# Patient Record
Sex: MALE | Race: WHITE | Employment: FULL TIME | URBAN - METROPOLITAN AREA
[De-identification: names, ages, dates, MRNs, and addresses within clinical notes are randomized per-mention and may not be internally consistent; named-entity substitution may affect disease eponyms.]

---

## 2020-07-24 ENCOUNTER — HOSPITAL ENCOUNTER (EMERGENCY)
Age: 30
Discharge: HOME | End: 2020-07-25
Attending: EMERGENCY MEDICINE
Payer: MEDICAID

## 2020-07-24 ENCOUNTER — APPOINTMENT (OUTPATIENT)
Dept: GENERAL RADIOLOGY | Age: 30
End: 2020-07-24
Payer: MEDICAID

## 2020-07-24 VITALS
HEIGHT: 70 IN | RESPIRATION RATE: 28 BRPM | SYSTOLIC BLOOD PRESSURE: 131 MMHG | OXYGEN SATURATION: 98 % | TEMPERATURE: 99.2 F | WEIGHT: 256.6 LBS | HEART RATE: 90 BPM | DIASTOLIC BLOOD PRESSURE: 83 MMHG | BODY MASS INDEX: 36.73 KG/M2

## 2020-07-24 PROCEDURE — 73130 X-RAY EXAM OF HAND: CPT

## 2020-07-24 PROCEDURE — 99283 EMERGENCY DEPT VISIT LOW MDM: CPT

## 2020-07-24 ASSESSMENT — PAIN SCALES - GENERAL: PAINLEVEL_OUTOF10: 10

## 2020-07-24 ASSESSMENT — PAIN DESCRIPTION - PAIN TYPE: TYPE: ACUTE PAIN

## 2020-07-24 ASSESSMENT — PAIN DESCRIPTION - ORIENTATION: ORIENTATION: LEFT

## 2020-07-24 ASSESSMENT — PAIN DESCRIPTION - LOCATION: LOCATION: FINGER (COMMENT WHICH ONE)

## 2020-07-24 ASSESSMENT — PAIN DESCRIPTION - ONSET: ONSET: SUDDEN

## 2020-07-24 ASSESSMENT — PAIN DESCRIPTION - DESCRIPTORS: DESCRIPTORS: THROBBING

## 2020-07-25 PROCEDURE — 6360000002 HC RX W HCPCS: Performed by: EMERGENCY MEDICINE

## 2020-07-25 PROCEDURE — 6370000000 HC RX 637 (ALT 250 FOR IP): Performed by: EMERGENCY MEDICINE

## 2020-07-25 PROCEDURE — 90471 IMMUNIZATION ADMIN: CPT | Performed by: EMERGENCY MEDICINE

## 2020-07-25 PROCEDURE — 90715 TDAP VACCINE 7 YRS/> IM: CPT | Performed by: EMERGENCY MEDICINE

## 2020-07-25 RX ORDER — HYDROCODONE BITARTRATE AND ACETAMINOPHEN 5; 325 MG/1; MG/1
1 TABLET ORAL EVERY 4 HOURS PRN
Qty: 18 TABLET | Refills: 0 | Status: SHIPPED | OUTPATIENT
Start: 2020-07-25 | End: 2020-07-28

## 2020-07-25 RX ORDER — CEPHALEXIN 500 MG/1
500 CAPSULE ORAL 4 TIMES DAILY
Qty: 40 CAPSULE | Refills: 0 | Status: SHIPPED | OUTPATIENT
Start: 2020-07-25 | End: 2020-08-05 | Stop reason: SDUPTHER

## 2020-07-25 RX ORDER — CEPHALEXIN 250 MG/1
1000 CAPSULE ORAL ONCE
Status: COMPLETED | OUTPATIENT
Start: 2020-07-25 | End: 2020-07-25

## 2020-07-25 RX ADMIN — TETANUS TOXOID, REDUCED DIPHTHERIA TOXOID AND ACELLULAR PERTUSSIS VACCINE, ADSORBED 0.5 ML: 5; 2.5; 8; 8; 2.5 SUSPENSION INTRAMUSCULAR at 01:00

## 2020-07-25 RX ADMIN — CEPHALEXIN 1000 MG: 250 CAPSULE ORAL at 01:00

## 2020-07-25 ASSESSMENT — PAIN DESCRIPTION - PAIN TYPE
TYPE: ACUTE PAIN
TYPE: ACUTE PAIN

## 2020-07-25 ASSESSMENT — PAIN SCALES - GENERAL
PAINLEVEL_OUTOF10: 10
PAINLEVEL_OUTOF10: 8

## 2020-07-25 ASSESSMENT — PAIN - FUNCTIONAL ASSESSMENT
PAIN_FUNCTIONAL_ASSESSMENT: ACTIVITIES ARE NOT PREVENTED
PAIN_FUNCTIONAL_ASSESSMENT: PREVENTS OR INTERFERES SOME ACTIVE ACTIVITIES AND ADLS
PAIN_FUNCTIONAL_ASSESSMENT: 0-10

## 2020-07-25 ASSESSMENT — PAIN DESCRIPTION - ORIENTATION
ORIENTATION: LEFT
ORIENTATION: LEFT

## 2020-07-25 ASSESSMENT — PAIN DESCRIPTION - PROGRESSION
CLINICAL_PROGRESSION: NOT CHANGED
CLINICAL_PROGRESSION: GRADUALLY IMPROVING

## 2020-07-25 ASSESSMENT — PAIN DESCRIPTION - LOCATION
LOCATION: FINGER (COMMENT WHICH ONE);HAND
LOCATION: HAND;FINGER (COMMENT WHICH ONE)

## 2020-07-25 ASSESSMENT — PAIN DESCRIPTION - DESCRIPTORS
DESCRIPTORS: THROBBING
DESCRIPTORS: THROBBING

## 2020-07-25 ASSESSMENT — PAIN DESCRIPTION - ONSET
ONSET: ON-GOING
ONSET: ON-GOING

## 2020-07-25 NOTE — ED PROVIDER NOTES
eMERGENCY dEPARTMENT eNCOUnter      Pt Name: Apryl Asencio  MRN: [id number]  [place] 1990  Date of evaluation: 7/24/2020  Provider: Ashley Shane MD     CHIEF COMPLAINT       Chief Complaint   Patient presents with    Animal Bite     Pt was breaking up a dog fight. He caught his LRF in dog's mouth. He states he felt a \"pop. \" Pt finger avulsed from just above DIP. Bone exposed. Area continues to bleed pressure and dressing applied per EMD; 0020 Hand soaking         HISTORY OF PRESENT ILLNESS   (Location/Symptom, Timing/Onset,Context/Setting, Quality, Duration, Modifying Factors, Severity) Note limiting factors. HPI    Apryl Asencio is a 30 y.o. male who presents to the emergency department with left index finger amputation. Patient is left-hand dominant was breaking up a dog fight and accidentally was bitten on the left hand. This happened about 30 minutes prior to arrival.  Tetanus is not up-to-date. Patient did put a dressing on prior to arrival.  No active bleeding. Patient appears to be very anxious. He states that he is afraid of needles. Nursing Notes were reviewed. REVIEW OFSYSTEMS    (2+ for level 4; 10+ for level 5)   Review of Systems    General: No fevers, chills or night sweats, No weight loss    Head:  No Sore throat,  No Ear Pain    Chest:  Nontender. No Cough, No SOB,  Chest Pain    GI: No abdominal pain or vomiting    : No dysuria or hematuria    Musculoskeletal: No unrelenting pain or night pain    Neurologic: No bowel or bladder incontinence, No saddle anesthesia, No leg weakness    All other systems reviewed and are negative. PAST MEDICAL HISTORY     Past Medical History:   Diagnosis Date    Anxiety     Opiate abuse, episodic ([facility] [address]        SURGICAL HISTORY     History reviewed. No pertinent surgical history. CURRENT MEDICATIONS       Previous Medications    No medications on file       ALLERGIES     No known allergies    FAMILY HISTORY     History reviewed.  No pertinent family history. SOCIAL HISTORY       Social History     Socioeconomic History    Marital status: Single     Spouse name: None    Number of children: None    Years of education: None    Highest education level: None   Occupational History    None   Social Needs    Financial resource strain: None    Food insecurity     Worry: None     Inability: None    Transportation needs     Medical: None     Non-medical: None   Tobacco Use    Smoking status: Current Every Day Smoker    Smokeless tobacco: Never Used   Substance and Sexual Activity    Alcohol use: Yes     Comment: occas    Drug use: Yes     Types: Marijuana    Sexual activity: None   Lifestyle    Physical activity     Days per week: None     Minutes per session: None    Stress: None   Relationships    Social connections     Talks on phone: None     Gets together: None     Attends Denominational service: None     Active member of club or organization: None     Attends meetings of clubs or organizations: None     Relationship status: None    Intimate partner violence     Fear of current or ex partner: None     Emotionally abused: None     Physically abused: None     Forced sexual activity: None   Other Topics Concern    None   Social History Narrative    None       SCREENINGS           PHYSICAL EXAM    (up to 7 for level 4, 8 or more for level 5)     ED Triage Vitals [07/24/20 2315]   BP Temp Temp Source Pulse Resp SpO2 Height Weight   131/83 99.2 °F (37.3 °C) Oral 90 28 98 % 5' 10\" (1.778 m) 256 lb 9.6 oz (116.4 kg)       Physical Exam    General: Alert and awake ×3. Nontoxic appearance. Well-developed well-nourished 23-year-old white male in moderate pain and anxiety. HEENT: Normocephalic atraumatic. Neck is supple. Airway intact. No adenopathy  Cardiac: Regular rate and rhythm with no murmurs rubs or gallops  Pulmonary: Lungs are clear in all lung fields. No wheezing. No Rales. Abdomen: Soft and nontender.   Negative hepatosplenomegaly. Bowel sounds are active  Extremities: Moving all extremities. No calf tenderness. Peripheral pulses all intact. Left hand dominant with index finger showing a 1 cm or the distal tip of his finger amputated. There is no nail that I can see at this point. I do do not see any bone protruding. But suspect may be a open fracture. Skin: No skin lesions. No rashes  Neurologic: Cranial nerves II through XII was grossly intact. Nonfocal neurological exam  Psychiatric: Patient is pleasant. Mood is appropriate. DIAGNOSTIC RESULTS     EKG (Per Emergency Physician):       RADIOLOGY (Per Emergency Physician): Interpretation per the Radiologist below, if available at the time of this note:  Xr Hand Left (min 3 Views)    Result Date: 7/25/2020  Traumatic bony and soft tissue amputation of distal 2nd digit to include tuft of distal 2nd phalanx. Soft tissue swelling and soft tissue gas more proximally to the 2nd digit, likely traumatic in etiology. No radiopaque foreign bodies noted. ED BEDSIDE ULTRASOUND:   Performed by ED Physician - none    LABS:  Labs Reviewed - No data to display     All other labs were within normal range or not returned as of this dictation. Procedures    Vigorous cleaning and irrigation of his finger wound. Very conservative treatment done. Occlusive dressing apply    Antibiotics given as well as tetanus updated in the ED. EMERGENCY DEPARTMENT COURSE and DIFFERENTIAL DIAGNOSIS/MDM:   Vitals:    Vitals:    07/24/20 2315   BP: 131/83   Pulse: 90   Resp: 28   Temp: 99.2 °F (37.3 °C)   TempSrc: Oral   SpO2: 98%   Weight: 256 lb 9.6 oz (116.4 kg)   Height: 5' 10\" (1.778 m)       Medications   Tetanus-Diphth-Acell Pertussis (BOOSTRIX) injection 0.5 mL (0.5 mLs Intramuscular Given 7/25/20 0100)   cephALEXin (KEFLEX) capsule 1,000 mg (1,000 mg Oral Given 7/25/20 0100)       MDM.     This is a 68-year-old with a left index finger amputation from a dog bite. Tetanus and antibiotics started. Patient did not want to have surgery. I gave him the option of conservative treatment and hand surgery patient states he does not not like needles and would take his\" chances\". Patient does not want to have a shortened index finger. Reviewed x-rays and options with him. Chances of infection greater but he will be placed on antibiotics. Referral to hand surgery for follow-up. Patient elected to do conservative treatment at this time. REVAL:         CRITICAL CARE TIME   Total CriticalCare time was 0 minutes, excluding separately reportable procedures. There was a high probability of clinically significant/life threatening deterioration in the patient's condition which required my urgent intervention. CONSULTS:  None    PROCEDURES:  Unless otherwise noted below, none     [unfilled]    FINAL IMPRESSION      1. Fingertip amputation, initial encounter    2. Dog bite, initial encounter    3. Open fracture of tuft of distal phalanx of finger          DISPOSITION/PLAN   DISPOSITION        PATIENT REFERRED TO:  Lukas Cruz MD  [address] [place] [address]  [phone]    Schedule an appointment as soon as possible for a visit in 1 week  If symptoms worsen      DISCHARGE MEDICATIONS:  New Prescriptions    CEPHALEXIN (KEFLEX) 500 MG CAPSULE    Take 1 capsule by mouth 4 times daily for 10 days    HYDROCODONE-ACETAMINOPHEN (NORCO) 5-325 MG PER TABLET    Take 1 tablet by mouth every 4 hours as needed for Pain for up to 3 days. Intended supply: 3 days. Take lowest dose possible to manage pain          (Please note:  Portions of this note were completed with a voice recognition program.Efforts were made to edit the dictations but occasionally words and phrases are mis-transcribed.)  Form v2016. J.5-cn    Gerhardt Cables LAM, MD (electronically signed)  Emergency Medicine Provider        Savannah Barker MD  07/25/20 0111

## 2020-07-27 ENCOUNTER — OFFICE VISIT (OUTPATIENT)
Dept: ORTHOPEDIC SURGERY | Age: 30
End: 2020-07-27
Payer: MEDICAID

## 2020-07-27 ENCOUNTER — TELEPHONE (OUTPATIENT)
Dept: ORTHOPEDIC SURGERY | Age: 30
End: 2020-07-27

## 2020-07-27 VITALS — TEMPERATURE: 96.6 F

## 2020-07-27 PROCEDURE — 99203 OFFICE O/P NEW LOW 30 MIN: CPT | Performed by: PHYSICIAN ASSISTANT

## 2020-07-27 PROCEDURE — G8427 DOCREV CUR MEDS BY ELIG CLIN: HCPCS | Performed by: PHYSICIAN ASSISTANT

## 2020-07-27 PROCEDURE — 4004F PT TOBACCO SCREEN RCVD TLK: CPT | Performed by: PHYSICIAN ASSISTANT

## 2020-07-27 PROCEDURE — G8419 CALC BMI OUT NRM PARAM NOF/U: HCPCS | Performed by: PHYSICIAN ASSISTANT

## 2020-07-28 NOTE — PROGRESS NOTES
Patient Name: Darren Altamirano  Medical Record Number: <[id number]>  YOB: 1990  Date of Encounter: 7/27/2020     Chief Complaint   Patient presents with    Hand Injury     Left hand       History of Present Illness:  Darren Altamirano is a 30 y.o. male who presents to the [address] [place] [address] for evaluation of his left hand. His pain assessment is documented below and I reviewed this with him today. Patient went to [facility] emergency department on 7/24/2020 after the tip of his left index finger was bitten off trying to break up a dog fight. He was scheduled to see Dr. Katlyn Granados today but was a no-show. He presents today just to have his dressing changed. He states he has been taking his antibiotics as directed. Past Medical History:   Diagnosis Date    Anxiety     Opiate abuse, episodic ([address]        History reviewed. No pertinent surgical history. Current Outpatient Medications   Medication Sig Dispense Refill    cephALEXin (KEFLEX) 500 MG capsule Take 1 capsule by mouth 4 times daily for 10 days 40 capsule 0    HYDROcodone-acetaminophen (NORCO) 5-325 MG per tablet Take 1 tablet by mouth every 4 hours as needed for Pain for up to 3 days. Intended supply: 3 days. Take lowest dose possible to manage pain 18 tablet 0     No current facility-administered medications for this visit. Allergies, social and family histories were reviewed and updated as appropriate. Review of Systems:  Relevant review of systems reviewed and available in the patient's chart under the 'MEDIA' tab. Vital Signs:  Temp 96.6 °F (35.9 °C) (Temporal)     General Exam:   Constitutional: Patient is adequately groomed with no evidence of malnutrition  Mental Status: The patient is oriented to time, place and person. The patient's mood and affect are appropriate. Lymphatic: The lymphatic examination bilaterally reveals all areas to be without enlargement or induration.   Neurological: The patient has good coordination and balance. There is no focal weakness or sensory deficit. Left Hand Examination:    On examination of patient's left hand there is a amputation of the tip of his left index finger. There is no erythema, induration, warmth or drainage. He seems to have full range of motion of the MCP, PIP and DIP joints. Impression:  Encounter Diagnoses   Name Primary?  Traumatic amputation of TIP OF left index finger Yes    Encounter for change of dressing        Treatment Plan:          Patient presented today for a dressing change of his left index finger. He has an amputation of the tip of his left index finger. This happened while trying to break up a dog fight on 7/24/2020. Dressing was changed and new sterile dressings applied. There is no sign of infection. Patient has a follow-up appointment with Almas Tobar on Wednesday    Joeann Lincoln Georgeana Carrel was informed of the results of any imaging. We discussed treatment options and a time was given to answer questions. A plan was proposed and Zelda Baxter understand and accepts this course of care. Electronically signed by Fadumo Lawler PA-C on 4/07/0955  Board Certified [facility]    Please note that portions of this note were completed with a voice recognition program.  Efforts were made to edit the dictations but occasionally words are mis-transcribed.

## 2020-08-04 ENCOUNTER — TELEPHONE (OUTPATIENT)
Dept: ORTHOPEDIC SURGERY | Age: 30
End: 2020-08-04

## 2020-08-04 NOTE — TELEPHONE ENCOUNTER
Please call patient regarding his appt for yesterday and  told patient he was being dismissed. Patient would like to schedule to see the hand doctor. Wanted to speak with manager.

## 2020-08-04 NOTE — TELEPHONE ENCOUNTER
Called and spoke with patient. Dr. Tray Baum has agreed to see patient. He understands he is to be NPO and arrive at 8:15 for 8:30am appt. I also made patient aware that if he is late he will be dismissed from the practice. He stated he understood and would be here by 8:15am NPO.

## 2020-08-05 ENCOUNTER — ANESTHESIA (OUTPATIENT)
Dept: OPERATING ROOM | Age: 30
End: 2020-08-05
Payer: MEDICAID

## 2020-08-05 ENCOUNTER — HOSPITAL ENCOUNTER (OUTPATIENT)
Age: 30
Setting detail: OUTPATIENT SURGERY
Discharge: HOME | End: 2020-08-05
Attending: ORTHOPAEDIC SURGERY | Admitting: ORTHOPAEDIC SURGERY
Payer: MEDICAID

## 2020-08-05 ENCOUNTER — ANESTHESIA EVENT (OUTPATIENT)
Dept: OPERATING ROOM | Age: 30
End: 2020-08-05
Payer: MEDICAID

## 2020-08-05 ENCOUNTER — OFFICE VISIT (OUTPATIENT)
Dept: ORTHOPEDIC SURGERY | Age: 30
End: 2020-08-05
Payer: MEDICAID

## 2020-08-05 VITALS
TEMPERATURE: 97 F | OXYGEN SATURATION: 99 % | HEART RATE: 70 BPM | SYSTOLIC BLOOD PRESSURE: 139 MMHG | RESPIRATION RATE: 16 BRPM | DIASTOLIC BLOOD PRESSURE: 91 MMHG

## 2020-08-05 VITALS
TEMPERATURE: 98.6 F | OXYGEN SATURATION: 96 % | RESPIRATION RATE: 2 BRPM | SYSTOLIC BLOOD PRESSURE: 132 MMHG | DIASTOLIC BLOOD PRESSURE: 83 MMHG

## 2020-08-05 VITALS — RESPIRATION RATE: 16 BRPM | HEIGHT: 70 IN | WEIGHT: 256 LBS | TEMPERATURE: 97.7 F | BODY MASS INDEX: 36.65 KG/M2

## 2020-08-05 LAB — SARS-COV-2, NAAT: NOT DETECTED

## 2020-08-05 PROCEDURE — 6360000002 HC RX W HCPCS: Performed by: NURSE ANESTHETIST, CERTIFIED REGISTERED

## 2020-08-05 PROCEDURE — 3700000000 HC ANESTHESIA ATTENDED CARE: Performed by: ORTHOPAEDIC SURGERY

## 2020-08-05 PROCEDURE — 99214 OFFICE O/P EST MOD 30 MIN: CPT | Performed by: ORTHOPAEDIC SURGERY

## 2020-08-05 PROCEDURE — 3600000002 HC SURGERY LEVEL 2 BASE: Performed by: ORTHOPAEDIC SURGERY

## 2020-08-05 PROCEDURE — G8417 CALC BMI ABV UP PARAM F/U: HCPCS | Performed by: ORTHOPAEDIC SURGERY

## 2020-08-05 PROCEDURE — 6370000000 HC RX 637 (ALT 250 FOR IP): Performed by: ANESTHESIOLOGY

## 2020-08-05 PROCEDURE — 2709999900 HC NON-CHARGEABLE SUPPLY: Performed by: ORTHOPAEDIC SURGERY

## 2020-08-05 PROCEDURE — 11012 DEB SKIN BONE AT FX SITE: CPT | Performed by: ORTHOPAEDIC SURGERY

## 2020-08-05 PROCEDURE — 4004F PT TOBACCO SCREEN RCVD TLK: CPT | Performed by: ORTHOPAEDIC SURGERY

## 2020-08-05 PROCEDURE — 7100000010 HC PHASE II RECOVERY - FIRST 15 MIN: Performed by: ORTHOPAEDIC SURGERY

## 2020-08-05 PROCEDURE — G8427 DOCREV CUR MEDS BY ELIG CLIN: HCPCS | Performed by: ORTHOPAEDIC SURGERY

## 2020-08-05 PROCEDURE — 3700000001 HC ADD 15 MINUTES (ANESTHESIA): Performed by: ORTHOPAEDIC SURGERY

## 2020-08-05 PROCEDURE — 6360000002 HC RX W HCPCS: Performed by: ORTHOPAEDIC SURGERY

## 2020-08-05 PROCEDURE — 7100000011 HC PHASE II RECOVERY - ADDTL 15 MIN: Performed by: ORTHOPAEDIC SURGERY

## 2020-08-05 PROCEDURE — 2580000003 HC RX 258: Performed by: ORTHOPAEDIC SURGERY

## 2020-08-05 PROCEDURE — 2500000003 HC RX 250 WO HCPCS: Performed by: NURSE ANESTHETIST, CERTIFIED REGISTERED

## 2020-08-05 PROCEDURE — 26765 TREAT FINGER FRACTURE EACH: CPT | Performed by: ORTHOPAEDIC SURGERY

## 2020-08-05 PROCEDURE — U0002 COVID-19 LAB TEST NON-CDC: HCPCS

## 2020-08-05 PROCEDURE — 2580000003 HC RX 258: Performed by: NURSE ANESTHETIST, CERTIFIED REGISTERED

## 2020-08-05 PROCEDURE — 3600000012 HC SURGERY LEVEL 2 ADDTL 15MIN: Performed by: ORTHOPAEDIC SURGERY

## 2020-08-05 PROCEDURE — 7100000001 HC PACU RECOVERY - ADDTL 15 MIN: Performed by: ORTHOPAEDIC SURGERY

## 2020-08-05 PROCEDURE — 7100000000 HC PACU RECOVERY - FIRST 15 MIN: Performed by: ORTHOPAEDIC SURGERY

## 2020-08-05 PROCEDURE — 2500000003 HC RX 250 WO HCPCS: Performed by: ORTHOPAEDIC SURGERY

## 2020-08-05 RX ORDER — LIDOCAINE HYDROCHLORIDE 20 MG/ML
INJECTION, SOLUTION EPIDURAL; INFILTRATION; INTRACAUDAL; PERINEURAL PRN
Status: DISCONTINUED | OUTPATIENT
Start: 2020-08-05 | End: 2020-08-05 | Stop reason: SDUPTHER

## 2020-08-05 RX ORDER — TRAMADOL HYDROCHLORIDE 50 MG/1
50 TABLET ORAL EVERY 6 HOURS PRN
Qty: 20 TABLET | Refills: 0 | Status: SHIPPED | OUTPATIENT
Start: 2020-08-05 | End: 2020-08-10

## 2020-08-05 RX ORDER — OXYCODONE HYDROCHLORIDE AND ACETAMINOPHEN 5; 325 MG/1; MG/1
2 TABLET ORAL PRN
Status: COMPLETED | OUTPATIENT
Start: 2020-08-05 | End: 2020-08-05

## 2020-08-05 RX ORDER — GLYCOPYRROLATE 0.2 MG/ML
INJECTION INTRAMUSCULAR; INTRAVENOUS PRN
Status: DISCONTINUED | OUTPATIENT
Start: 2020-08-05 | End: 2020-08-05 | Stop reason: SDUPTHER

## 2020-08-05 RX ORDER — MAGNESIUM HYDROXIDE 1200 MG/15ML
LIQUID ORAL CONTINUOUS PRN
Status: COMPLETED | OUTPATIENT
Start: 2020-08-05 | End: 2020-08-05

## 2020-08-05 RX ORDER — ONDANSETRON 2 MG/ML
INJECTION INTRAMUSCULAR; INTRAVENOUS PRN
Status: DISCONTINUED | OUTPATIENT
Start: 2020-08-05 | End: 2020-08-05 | Stop reason: SDUPTHER

## 2020-08-05 RX ORDER — ONDANSETRON 2 MG/ML
4 INJECTION INTRAMUSCULAR; INTRAVENOUS
Status: DISCONTINUED | OUTPATIENT
Start: 2020-08-05 | End: 2020-08-05 | Stop reason: HOSPADM

## 2020-08-05 RX ORDER — SODIUM CHLORIDE 9 MG/ML
INJECTION, SOLUTION INTRAVENOUS CONTINUOUS PRN
Status: DISCONTINUED | OUTPATIENT
Start: 2020-08-05 | End: 2020-08-05 | Stop reason: SDUPTHER

## 2020-08-05 RX ORDER — BUPIVACAINE HYDROCHLORIDE 5 MG/ML
INJECTION, SOLUTION EPIDURAL; INTRACAUDAL
Status: COMPLETED | OUTPATIENT
Start: 2020-08-05 | End: 2020-08-05

## 2020-08-05 RX ORDER — MIDAZOLAM HYDROCHLORIDE 1 MG/ML
INJECTION INTRAMUSCULAR; INTRAVENOUS PRN
Status: DISCONTINUED | OUTPATIENT
Start: 2020-08-05 | End: 2020-08-05 | Stop reason: SDUPTHER

## 2020-08-05 RX ORDER — DEXAMETHASONE SODIUM PHOSPHATE 4 MG/ML
INJECTION, SOLUTION INTRA-ARTICULAR; INTRALESIONAL; INTRAMUSCULAR; INTRAVENOUS; SOFT TISSUE PRN
Status: DISCONTINUED | OUTPATIENT
Start: 2020-08-05 | End: 2020-08-05 | Stop reason: SDUPTHER

## 2020-08-05 RX ORDER — CEFAZOLIN SODIUM 1 G/3ML
INJECTION, POWDER, FOR SOLUTION INTRAMUSCULAR; INTRAVENOUS PRN
Status: DISCONTINUED | OUTPATIENT
Start: 2020-08-05 | End: 2020-08-05

## 2020-08-05 RX ORDER — KETAMINE HCL IN NACL, ISO-OSM 100MG/10ML
SYRINGE (ML) INJECTION PRN
Status: DISCONTINUED | OUTPATIENT
Start: 2020-08-05 | End: 2020-08-05 | Stop reason: SDUPTHER

## 2020-08-05 RX ORDER — FENTANYL CITRATE 50 UG/ML
25 INJECTION, SOLUTION INTRAMUSCULAR; INTRAVENOUS EVERY 5 MIN PRN
Status: DISCONTINUED | OUTPATIENT
Start: 2020-08-05 | End: 2020-08-05 | Stop reason: HOSPADM

## 2020-08-05 RX ORDER — PROPOFOL 10 MG/ML
INJECTION, EMULSION INTRAVENOUS PRN
Status: DISCONTINUED | OUTPATIENT
Start: 2020-08-05 | End: 2020-08-05 | Stop reason: SDUPTHER

## 2020-08-05 RX ORDER — CEPHALEXIN 500 MG/1
500 CAPSULE ORAL 4 TIMES DAILY
Qty: 40 CAPSULE | Refills: 0 | Status: SHIPPED | OUTPATIENT
Start: 2020-08-05 | End: 2020-08-15

## 2020-08-05 RX ORDER — FENTANYL CITRATE 50 UG/ML
INJECTION, SOLUTION INTRAMUSCULAR; INTRAVENOUS PRN
Status: DISCONTINUED | OUTPATIENT
Start: 2020-08-05 | End: 2020-08-05 | Stop reason: SDUPTHER

## 2020-08-05 RX ORDER — OXYCODONE HYDROCHLORIDE AND ACETAMINOPHEN 5; 325 MG/1; MG/1
1 TABLET ORAL PRN
Status: COMPLETED | OUTPATIENT
Start: 2020-08-05 | End: 2020-08-05

## 2020-08-05 RX ADMIN — DEXAMETHASONE SODIUM PHOSPHATE 8 MG: 4 INJECTION, SOLUTION INTRAMUSCULAR; INTRAVENOUS at 13:04

## 2020-08-05 RX ADMIN — PROPOFOL 100 MG: 10 INJECTION, EMULSION INTRAVENOUS at 13:08

## 2020-08-05 RX ADMIN — ONDANSETRON 4 MG: 2 INJECTION INTRAMUSCULAR; INTRAVENOUS at 13:04

## 2020-08-05 RX ADMIN — GLYCOPYRROLATE 0.2 MG: 0.2 INJECTION, SOLUTION INTRAMUSCULAR; INTRAVENOUS at 13:04

## 2020-08-05 RX ADMIN — LIDOCAINE HYDROCHLORIDE 100 MG: 20 INJECTION, SOLUTION EPIDURAL; INFILTRATION; INTRACAUDAL; PERINEURAL at 12:59

## 2020-08-05 RX ADMIN — OXYCODONE HYDROCHLORIDE AND ACETAMINOPHEN 2 TABLET: 5; 325 TABLET ORAL at 14:42

## 2020-08-05 RX ADMIN — Medication 30 MG: at 13:20

## 2020-08-05 RX ADMIN — MIDAZOLAM 2 MG: 1 INJECTION INTRAMUSCULAR; INTRAVENOUS at 12:55

## 2020-08-05 RX ADMIN — CEFAZOLIN 2 G: 10 INJECTION, POWDER, FOR SOLUTION INTRAVENOUS at 13:06

## 2020-08-05 RX ADMIN — FENTANYL CITRATE 100 MCG: 50 INJECTION INTRAMUSCULAR; INTRAVENOUS at 12:56

## 2020-08-05 RX ADMIN — HYDROMORPHONE HYDROCHLORIDE 1 MG: 1 INJECTION, SOLUTION INTRAMUSCULAR; INTRAVENOUS; SUBCUTANEOUS at 13:17

## 2020-08-05 RX ADMIN — SODIUM CHLORIDE: 9 INJECTION, SOLUTION INTRAVENOUS at 12:54

## 2020-08-05 RX ADMIN — PROPOFOL 200 MG: 10 INJECTION, EMULSION INTRAVENOUS at 12:59

## 2020-08-05 ASSESSMENT — PAIN DESCRIPTION - ORIENTATION
ORIENTATION: LEFT
ORIENTATION: LEFT

## 2020-08-05 ASSESSMENT — PAIN DESCRIPTION - DESCRIPTORS
DESCRIPTORS: THROBBING

## 2020-08-05 ASSESSMENT — PAIN DESCRIPTION - DIRECTION: RADIATING_TOWARDS: INTO HAND

## 2020-08-05 ASSESSMENT — PULMONARY FUNCTION TESTS
PIF_VALUE: 14
PIF_VALUE: 15
PIF_VALUE: 14
PIF_VALUE: 15
PIF_VALUE: 14
PIF_VALUE: 15
PIF_VALUE: 0
PIF_VALUE: 0
PIF_VALUE: 15
PIF_VALUE: 1
PIF_VALUE: 15
PIF_VALUE: 15
PIF_VALUE: 14
PIF_VALUE: 14
PIF_VALUE: 16
PIF_VALUE: 2
PIF_VALUE: 2
PIF_VALUE: 15
PIF_VALUE: 16
PIF_VALUE: 0
PIF_VALUE: 14
PIF_VALUE: 1
PIF_VALUE: 2
PIF_VALUE: 14
PIF_VALUE: 16
PIF_VALUE: 1
PIF_VALUE: 15
PIF_VALUE: 0
PIF_VALUE: 14
PIF_VALUE: 16
PIF_VALUE: 15
PIF_VALUE: 15
PIF_VALUE: 13
PIF_VALUE: 14
PIF_VALUE: 14

## 2020-08-05 ASSESSMENT — PAIN SCALES - GENERAL
PAINLEVEL_OUTOF10: 0
PAINLEVEL_OUTOF10: 10
PAINLEVEL_OUTOF10: 0
PAINLEVEL_OUTOF10: 0
PAINLEVEL_OUTOF10: 5

## 2020-08-05 ASSESSMENT — PAIN DESCRIPTION - ONSET
ONSET: ON-GOING
ONSET: ON-GOING

## 2020-08-05 ASSESSMENT — PAIN - FUNCTIONAL ASSESSMENT
PAIN_FUNCTIONAL_ASSESSMENT: PREVENTS OR INTERFERES SOME ACTIVE ACTIVITIES AND ADLS
PAIN_FUNCTIONAL_ASSESSMENT: ACTIVITIES ARE NOT PREVENTED
PAIN_FUNCTIONAL_ASSESSMENT: 0-10
PAIN_FUNCTIONAL_ASSESSMENT: ACTIVITIES ARE NOT PREVENTED

## 2020-08-05 ASSESSMENT — PAIN DESCRIPTION - PROGRESSION
CLINICAL_PROGRESSION: NOT CHANGED
CLINICAL_PROGRESSION: RAPIDLY WORSENING

## 2020-08-05 ASSESSMENT — PAIN DESCRIPTION - LOCATION
LOCATION: FINGER (COMMENT WHICH ONE)
LOCATION: FINGER (COMMENT WHICH ONE)

## 2020-08-05 ASSESSMENT — ENCOUNTER SYMPTOMS: SHORTNESS OF BREATH: 0

## 2020-08-05 ASSESSMENT — PAIN DESCRIPTION - FREQUENCY
FREQUENCY: CONTINUOUS
FREQUENCY: CONTINUOUS

## 2020-08-05 ASSESSMENT — LIFESTYLE VARIABLES: SMOKING_STATUS: 1

## 2020-08-05 ASSESSMENT — PAIN DESCRIPTION - PAIN TYPE
TYPE: SURGICAL PAIN
TYPE: ACUTE PAIN

## 2020-08-05 NOTE — PROGRESS NOTES
Alert and oriented. Denies pain or nausea at this time. Vss. Dressing is clean dry intact. Fingers are warm, move well, nataliia well. Tolerated sitting up and po fluids and crackers well. patient and friend verbalized understanding of discharge instructions.

## 2020-08-05 NOTE — PROGRESS NOTES
From PACU. Alert and oriented. Denies nausea or pain now. Dressing is clean dry intact. Fingers are warm, move well, nataliia well.

## 2020-08-05 NOTE — PROGRESS NOTES
CHIEF COMPLAINT: Left hand pain/ index finger distal phalanx open fracture, dog bite. DATE OF INJURY: 7/24/2020    HISTORY:  Mr. Christine Potts 30 y.o.  male left handed presents today for the first visit for evaluation of a left hand injury which occurred when he was bitten by his dog. He is complaining of index finger pain and swelling. This is better with elevation and worse with ROM. The pain is sharp and not radiating. No other complaint. He was seen at [facility], was evaluated and splinted and asked to see orthopedics Dr Ashley Hernandez, He was scheduled to see Dr Marcelina Mcdermott on 7/27 at [facility], he couldn't make it there as he is a [facility], then he went to [address] [place] [address]. As Dr Ashley Hernandez was OOT, he was scheduled with Deangelo Isidro 7/29/2020, then 8/3/2020 when he was 45 min late and then rescheduled to see me today. He uses Marijuana as medical treatment for chronic LBP    Past Medical History:   Diagnosis Date    Anxiety     Opiate abuse, episodic ([facility] [address]      History reviewed. No pertinent surgical history.   Social History     Socioeconomic History    Marital status: Single     Spouse name: Not on file    Number of children: Not on file    Years of education: Not on file    Highest education level: Not on file   Occupational History    Not on file   Social Needs    Financial resource strain: Not on file    Food insecurity     Worry: Not on file     Inability: Not on file    Transportation needs     Medical: Not on file     Non-medical: Not on file   Tobacco Use    Smoking status: Current Every Day Smoker    Smokeless tobacco: Never Used   Substance and Sexual Activity    Alcohol use: Yes     Comment: occas    Drug use: Yes     Types: Marijuana    Sexual activity: Not on file   Lifestyle    Physical activity     Days per week: Not on file     Minutes per session: Not on file    Stress: Not on file   Relationships    Social connections     Talks on phone: Not on file     Gets together: Not on file     Attends

## 2020-08-05 NOTE — ANESTHESIA POSTPROCEDURE EVALUATION
Department of Anesthesiology  Postprocedure Note    Patient: Gustavo Kennedy  MRN: [id number]  YOB: 1990  Date of evaluation: 8/5/2020  Time:  3:04 PM     Procedure Summary     Date:  08/05/20 Room / Location:  [address] [place] [address]    Anesthesia Start:  1255 Anesthesia Stop:  7540    Procedure:  INCISION AND DRAINAGE LEFT INDEX FINGER OPEN FRACTURE (Left Hand) Diagnosis:  (.)    Surgeon:  Alisa Rodríguez MD Responsible Provider:  Lisa Blue MD    Anesthesia Type:  general ASA Status:  2          Anesthesia Type: general    Martha Phase I: Martha Score: 9    Martha Phase II: Martha Score: 10    Last vitals: Reviewed and per EMR flowsheets.        Anesthesia Post Evaluation    Patient location during evaluation: PACU  Patient participation: complete - patient participated  Level of consciousness: awake and alert  Airway patency: patent  Nausea & Vomiting: no nausea and no vomiting  Complications: no  Cardiovascular status: hemodynamically stable  Respiratory status: acceptable  Hydration status: stable

## 2020-08-05 NOTE — BRIEF OP NOTE
Brief Postoperative Note      Patient: Billie Salinas  YOB: 1990  MRN: [id number]    Date of Procedure: 8/5/2020    Pre-Op Diagnosis:  Left hand index finger distal phalanx open fracture.     Post-Op Diagnosis: Same       Procedure(s):  INCISION AND DRAINAGE LEFT INDEX FINGER OPEN FRACTURE    Surgeon(s):  Conner Ballesteros MD    Assistant: Gustavo Webster CNP    Anesthesia: General    Estimated Blood Loss (mL): Minimal    Complications: None    Specimens:   * No specimens in log *    Implants:  * No implants in log *      Drains: * No LDAs found *    Findings: Same    Electronically signed by Conner Ballesteros MD on 8/5/2020 at 3:10 PM

## 2020-08-05 NOTE — ANESTHESIA PRE PROCEDURE
Department of Anesthesiology  Preprocedure Note       Name:  Priya Warren   Age:  30 y.o.  :  1990                                          MRN:  [id number]         Date:  2020      Surgeon: Carlito Gottlieb):  Bruce Morales MD    Procedure: Procedure(s):  INCISION AND DRAINAGE LEFT INDEX FINGER OPEN FRACTURE    Medications prior to admission:   Prior to Admission medications    Medication Sig Start Date End Date Taking? Authorizing Provider   medical marijuana Take 1 each by mouth as needed. Yes Historical Provider, MD   cephALEXin (KEFLEX) 500 MG capsule Take 1 capsule by mouth 4 times daily for 10 days 8/5/20 8/15/20  Bruce Morales MD   traMADol (ULTRAM) 50 MG tablet Take 1 tablet by mouth every 6 hours as needed for Pain for up to 5 days. 8/5/20 8/10/20  Bruce Morales MD       Current medications:    No current facility-administered medications for this encounter. Allergies: Allergies   Allergen Reactions    No Known Allergies        Problem List:  There is no problem list on file for this patient. Past Medical History:        Diagnosis Date    Anxiety     Opiate abuse, episodic ([facility] [address]        Past Surgical History:  No past surgical history on file.     Social History:    Social History     Tobacco Use    Smoking status: Current Every Day Smoker    Smokeless tobacco: Never Used   Substance Use Topics    Alcohol use: Yes     Comment: occas                                Ready to quit: Not Answered  Counseling given: Not Answered      Vital Signs (Current):   Vitals:    20 1052   BP: 127/65   Pulse: 83   Resp: 14   Temp: 97 °F (36.1 °C)   TempSrc: Temporal   SpO2: 94%                                              BP Readings from Last 3 Encounters:   20 127/65   20 131/83       NPO Status: Time of last liquid consumption:                         Time of last solid consumption:                         Date of last liquid consumption: 20 Date of last solid food consumption: 08/04/20    BMI:   Wt Readings from Last 3 Encounters:   08/05/20 256 lb (116.1 kg)   07/24/20 256 lb 9.6 oz (116.4 kg)     There is no height or weight on file to calculate BMI.    CBC:   Lab Results   Component Value Date    WBC 10.2 05/11/2010    RBC 5.27 05/11/2010    HGB 16.1 05/11/2010    HCT 46.8 05/11/2010    MCV 88.9 05/11/2010    RDW 12.8 05/11/2010     05/11/2010       CMP:   Lab Results   Component Value Date     05/11/2010    K 3.7 05/11/2010     05/11/2010    CO2 23 05/11/2010    BUN 9 05/11/2010    CREATININE 0.9 05/11/2010    GFRAA >60 05/11/2010    AGRATIO 1.6 05/11/2010    GLUCOSE 143 05/11/2010    PROT 8.2 05/11/2010    CALCIUM 10.1 05/11/2010    BILITOT 1.30 05/11/2010    ALKPHOS 80 05/11/2010    AST 22 05/11/2010    ALT 23 05/11/2010       POC Tests: No results for input(s): POCGLU, POCNA, POCK, POCCL, POCBUN, POCHEMO, POCHCT in the last 72 hours. Coags: No results found for: PROTIME, INR, APTT    HCG (If Applicable): No results found for: PREGTESTUR, PREGSERUM, HCG, HCGQUANT     ABGs: No results found for: PHART, PO2ART, SKA1DWT, HEU8BKB, BEART, W2SYSYFD     Type & Screen (If Applicable):  No results found for: LABABO, LABRH    Drug/Infectious Status (If Applicable):  No results found for: HIV, HEPCAB    COVID-19 Screening (If Applicable): No results found for: COVID19      Anesthesia Evaluation  Patient summary reviewed no history of anesthetic complications:   Airway: Mallampati: II  TM distance: >3 FB   Neck ROM: full  Mouth opening: > = 3 FB Dental:      Comment: No loose teeth    Pulmonary:normal exam    (+) current smoker (Smoked MJ 1000)    (-) COPD, asthma, shortness of breath and recent URI          Patient smoked on day of surgery. ROS comment: Patient states he has a medical MJ card, according to patient, he smokes all day every day.     Cardiovascular:        (-) hypertension, valvular problems/murmurs, past MI, CAD, CABG/stent, dysrhythmias,  angina,  CHF, orthopnea and murmur                Neuro/Psych:   (+) psychiatric history (alert and oriented x 3):depression/anxiety    (-) seizures, neuromuscular disease, TIA and CVA           GI/Hepatic/Renal:        (-) GERD, PUD, hepatitis, liver disease, no renal disease and bowel prep       Endo/Other:        (-) diabetes mellitus, hypothyroidism, hyperthyroidism, blood dyscrasia, arthritis, no electrolyte abnormalities               Abdominal:           Vascular:                                      Anesthesia Plan      general     ASA 2     (Patient is competent to sign his own consent. Shows understanding and risks of procedure. Answers questions appropriately. )  Induction: intravenous. MIPS: Postoperative opioids intended and Prophylactic antiemetics administered. Anesthetic plan and risks discussed with patient. Plan discussed with CRNA. This pre-anesthesia assessment may be used as a history and physical.    DOS STAFF ADDENDUM:    Pt seen and examined, chart reviewed (including anesthesia, drug and allergy history). No interval changes to history and physical examination. Anesthetic plan, risks, benefits, alternatives, and personnel involved discussed with patient. Patient verbalized an understanding and agrees to proceed.       Hardy Damon MD  August 5, 2020  11:11 AM    Hardy Damon MD   8/5/2020

## 2020-08-05 NOTE — H&P
Preoperative H&P Update    The patient's History and Physical in the medical record from 8/5/2020 was reviewed by me today. Past Medical History:   Diagnosis Date    Anxiety     Opiate abuse, episodic ([facility])      No past surgical history on file. No current facility-administered medications on file prior to encounter. Current Outpatient Medications on File Prior to Encounter   Medication Sig Dispense Refill    medical marijuana Take 1 each by mouth as needed.  cephALEXin (KEFLEX) 500 MG capsule Take 1 capsule by mouth 4 times daily for 10 days 40 capsule 0    traMADol (ULTRAM) 50 MG tablet Take 1 tablet by mouth every 6 hours as needed for Pain for up to 5 days. 20 tablet 0       Allergies   Allergen Reactions    No Known Allergies       I reviewed the HPI, medications, allergies, reason for surgery, diagnosis and treatment plan and there has been no change. The patient was evaluated by me today. Physical exam findings for this update include:    Vitals:    08/05/20 1052   BP: 127/65   Pulse: 83   Resp: 14   Temp: 97 °F (36.1 °C)   SpO2: 94%     Airway is intact  Chest: chest clear, no wheezing, rales, normal symmetric air entry, no tachypnea, retractions or cyanosis  Heart: regular rate and rhythm ; heart sounds normal  Findings on exam of the body region where surgery is to be performed include:  Left index distal phalanx open fracture.     Electronically signed by Sergio Villela on 8/5/2020 at 11:13 AM

## 2020-08-05 NOTE — PROGRESS NOTES
Awake. Currently denies pain but worried about what he will do if it starts to hurt. Denies nausea. Taking ice chips. States Santiago [phone] for ride. Elevated BP Dr. Gi Child aware no orders rec'd.

## 2020-08-05 NOTE — PROGRESS NOTES
atient wants to leave. Not happy with MD pain medication order when he gets home. Currently denies pain.  VSS for transfer to phase 2

## 2020-08-06 NOTE — OP NOTE
[address] [facility]                                OPERATIVE REPORT    PATIENT NAME: Mendoza Becker                      :        1990  MED REC NO:   [id number]                          ROOM:  ACCOUNT NO:   [de-identified]                           ADMIT DATE: 2020  PROVIDER:     Bashir Mcknight MD      DATE OF PROCEDURE:  2020    PREOPERATIVE DIAGNOSIS:  Left hand index finger distal phalanx open  fracture. POSTOPERATIVE DIAGNOSIS:  Left hand index finger distal phalanx open  fracture. OPERATION PERFORMED:  1. Debridement of open fracture with excisional debridement of skin,  subcutaneous tissue, fascia and bone. 2.  Open treatment of left index finger distal phalanx fracture. SURGEON:  Bashir Mcknight MD    ASSISTANT:  Kb Magana CNP    ANESTHESIA:  General anesthesia. ESTIMATED BLOOD LOSS:  Minimal.    COMPLICATIONS:  None. TOURNIQUET:  Left index finger base. INDICATIONS:  This is a 17-year-old white male, left-hand dominant, who  sustained injury to his left index finger when he was bitten by his dog. This happened on 2020. He was seen at [facility] Room  on 2020, was referred to hand surgery and was unable to be  scheduled, as he missed an appointment on 2020, seen in the after  hours on that days and because of Dr. Erika Ghosh being out of town, he was  not able to be seen that week. He also missed two appointments. He was  late for his appointment on Monday, and he was scheduled with me for  evaluation today. Upon evaluation, he was found to have an exposed bone  at the tip of the index finger. We recommended surgical debridement. All risks, benefits, and alternatives were discussed with the patient. He agreed to proceed with surgical treatment. OPERATIVE PROCEDURE:  The patient's left index finger and hand were  marked.   He received 2 gm of Ancef IV preoperatively. The patient was  then brought to the operating room and underwent general anesthesia. The left upper extremity was then prepped and draped in regular sterile  routine fashion. A time-out was called, confirming the patient's name,  site, and procedure. We used the tourniquet as a mode of controlling bleeding at the base of  the index finger. We then carefully cleaned up the tip of the index  finger. We used #15 blade for excisional debridement of the skin,  subcutaneous tissue, down the fascia and then the bone as well with a  rongeur. After that we irrigated the wound copiously with normal saline  mixed with gentamicin. We then removed part of the skin on each edge to  allow for a better closure of the skin. We then let the tourniquet down. Hemostasis was secured. We then  closed the wound with 4-0 nylon. Dressing was then applied in form of  Xeroform, 4x4, sterile Kerlix and Coban. The patient tolerated the procedure well and was taken to the recovery  in stable condition. Cheryl Haque CNP was 1st Assist given the nature of the procedure that needed advanced assistance. POSTOPERATIVE PLAN:  The patient will be discharged home. He will  continue on p.o. antibiotics for 10 more days. He can change the  dressing in three days. We will keep sutures for at least two weeks.         Alise Solis MD    D: 08/05/2020 15:24:00       T: 08/05/2020 17:03:57     [email or web address]  Job#: [id number]     Doc#: [id number]    CC:

## 2020-08-11 ENCOUNTER — TELEPHONE (OUTPATIENT)
Dept: ORTHOPEDIC SURGERY | Age: 30
End: 2020-08-11

## 2020-08-11 NOTE — TELEPHONE ENCOUNTER
Called and spoke to Luis Mendiola, informed him that per SMA okay to take off bandage and apply bandaide, or re-wrap with gauze. Patient advised to keep incision, clean and dry until post op appt.  Patient in agreement to plan

## 2020-08-11 NOTE — TELEPHONE ENCOUNTER
Patient called wanting to know what he should do about his bandage on his hand. He states it is falling apart and needs to be replaced.  Ph# [phone]

## 2020-08-19 ENCOUNTER — OFFICE VISIT (OUTPATIENT)
Dept: ORTHOPEDIC SURGERY | Age: 30
End: 2020-08-19
Payer: MEDICAID

## 2020-08-19 VITALS — TEMPERATURE: 98.7 F | BODY MASS INDEX: 36.65 KG/M2 | WEIGHT: 256 LBS | HEIGHT: 70 IN

## 2020-08-19 PROCEDURE — 99024 POSTOP FOLLOW-UP VISIT: CPT | Performed by: ORTHOPAEDIC SURGERY

## 2020-08-19 PROCEDURE — 99406 BEHAV CHNG SMOKING 3-10 MIN: CPT | Performed by: ORTHOPAEDIC SURGERY

## 2020-08-19 NOTE — PROGRESS NOTES
DIAGNOSIS:  Left index finger distal phalanx open fracture, s/p excisional debridement, and open treatment of the fracture. DATE OF SURGERY: 8/5/2020. HISTORY OF PRESENT ILLNESS:  Mr. Kassy Laws 30 y.o.  male who came in today for 2 weeks postoperative visit. The patient reports pain in the left hand. Rates pain a 10/10 VAS moderate, aching, tender, constant and show no change. Aggravating factors movement, touching. Alleviating factors elevation and rest and Percocet. He has been WBAT. No numbness or tingling sensation. No fever or Chills. He is repeatedly asking for more pain medication. He completed his antibiotics.     Past Medical History:   Diagnosis Date    Anxiety     Opiate abuse, episodic (formerly [facility])        Past Surgical History:   Procedure Laterality Date    HAND SURGERY Left 8/5/2020    INCISION AND DRAINAGE LEFT INDEX FINGER OPEN FRACTURE performed by Suzanne Inman MD at [address] [place] [address] History     Socioeconomic History    Marital status: Single     Spouse name: Not on file    Number of children: Not on file    Years of education: Not on file    Highest education level: Not on file   Occupational History    Not on file   Social Needs    Financial resource strain: Not on file    Food insecurity     Worry: Not on file     Inability: Not on file    Transportation needs     Medical: Not on file     Non-medical: Not on file   Tobacco Use    Smoking status: Current Every Day Smoker    Smokeless tobacco: Never Used   Substance and Sexual Activity    Alcohol use: Yes     Comment: occas    Drug use: Yes     Types: Marijuana    Sexual activity: Not on file   Lifestyle    Physical activity     Days per week: Not on file     Minutes per session: Not on file    Stress: Not on file   Relationships    Social connections     Talks on phone: Not on file     Gets together: Not on file     Attends Druze service: Not on file     Active member of club or organization: Not on file Attends meetings of clubs or organizations: Not on file     Relationship status: Not on file    Intimate partner violence     Fear of current or ex partner: Not on file     Emotionally abused: Not on file     Physically abused: Not on file     Forced sexual activity: Not on file   Other Topics Concern    Not on file   Social History Narrative    Not on file       No family history on file. Current Outpatient Medications on File Prior to Visit   Medication Sig Dispense Refill    medical marijuana Take 1 each by mouth as needed. No current facility-administered medications on file prior to visit. Pertinent items are noted in HPI  Review of systems reviewed from Patient History Form dated on 8/5/2020 and available in the patient's chart under the Media tab. No change noted. PHYSICAL EXAMINATION:  Mr. Tim Jernigan is a very pleasant 30 y.o.  male who presents today in no acute distress, awake, alert, and oriented. He is well dressed, nourished and  groomed. Patient with normal affect. Height is  5' 10\" (1.778 m), weight is 256 lb (116.1 kg), Body mass index is 36.73 kg/m². Resting respiratory rate is 16. The patient walks with no limp. The incision distal tuft index finger is sutured, still not completely healed. No signs of any erythema or drainage, moderate swelling. He has no pain with the active or passive range of motion of the left hand and decreased ROM left index finger. He has intact sensation distally, and he is neurovascularly intact. IMAGING: Tarsha Avila were reviewed, taken today in the office, 3 views of the left hand, and showed a second finger distal phalanx fracture, s/p partial removal.    IMPRESSION:  2 weeks out from left index finger distal phalanx open fracture, s/p excisional debridement and open treatment of the fracture, and doing very well. PLAN: He was instructed to avoid heavy impact activities for 6 weeks. Dressing change daily and PRN.   He was instructed to rest and elevate. He can take NSAIDs, meloxicam for pain. The patient will come back for a follow up in 10 days and will consider suture removal at that time. .      The patient understands that there is a chance of abscess recurrence even after surgical I&D. The patient smokes, and we discussed with the patient the risks of smoking on general health and also on bone and soft tissue healing (delay and non-union), and promised to cut down or stop smoking. Smoking: Educated the patient regarding the hazards of smoking and that it harms their body in many ways. It increases the chance of developing heart disease, lung disease, cancer, and other health problems including poor bone and wound healing. The importance of smoking cessation for optimal bone and wound healing was stressed. This was communicated verbally, 5 Minutes.       Kali Chaney MD

## 2020-08-20 PROBLEM — S68.111A TRAUMATIC AMPUTATION OF LEFT INDEX FINGER: Status: ACTIVE | Noted: 2020-08-20

## 2020-08-20 PROBLEM — F17.200 CURRENT SMOKER: Status: ACTIVE | Noted: 2020-08-20

## 2020-08-20 RX ORDER — MELOXICAM 7.5 MG/1
7.5 TABLET ORAL DAILY
Qty: 30 TABLET | Refills: 0 | Status: SHIPPED | OUTPATIENT
Start: 2020-08-20 | End: 2020-09-19

## 2020-09-02 ENCOUNTER — OFFICE VISIT (OUTPATIENT)
Dept: ORTHOPEDIC SURGERY | Age: 30
End: 2020-09-02
Payer: MEDICAID

## 2020-09-02 VITALS — BODY MASS INDEX: 36.65 KG/M2 | TEMPERATURE: 97.7 F | HEIGHT: 70 IN | WEIGHT: 256 LBS

## 2020-09-02 PROCEDURE — 99406 BEHAV CHNG SMOKING 3-10 MIN: CPT | Performed by: ORTHOPAEDIC SURGERY

## 2020-09-02 PROCEDURE — 99024 POSTOP FOLLOW-UP VISIT: CPT | Performed by: ORTHOPAEDIC SURGERY

## 2020-09-02 NOTE — PROGRESS NOTES
DIAGNOSIS:  Left index finger distal phalanx open fracture, s/p excisional debridement, and open treatment of the fracture. DATE OF SURGERY: 8/5/2020. HISTORY OF PRESENT ILLNESS:  Mr. Claudia Mc 30 y.o.  male who came in today for 2 weeks postoperative visit. The patient reports pain in the left hand. Rates pain a 8/10 VAS moderate, aching, tender, constant and slightly improved. Aggravating factors movement, touching or hitting the end of his finger. Alleviating factors elevation and rest and is asking for more pain medication. He has been WBAT. No numbness or tingling sensation. No fever or Chills. He is repeatedly asking for more pain medication. He completed his antibiotics.     Past Medical History:   Diagnosis Date    Anxiety     Opiate abuse, episodic ([facility])        Past Surgical History:   Procedure Laterality Date    HAND SURGERY Left 8/5/2020    INCISION AND DRAINAGE LEFT INDEX FINGER OPEN FRACTURE performed by Leeanna Gosselin, MD at [address] [place] [address] History     Socioeconomic History    Marital status: Single     Spouse name: Not on file    Number of children: Not on file    Years of education: Not on file    Highest education level: Not on file   Occupational History    Not on file   Social Needs    Financial resource strain: Not on file    Food insecurity     Worry: Not on file     Inability: Not on file    Transportation needs     Medical: Not on file     Non-medical: Not on file   Tobacco Use    Smoking status: Current Every Day Smoker    Smokeless tobacco: Never Used   Substance and Sexual Activity    Alcohol use: Yes     Comment: occas    Drug use: Yes     Types: Marijuana    Sexual activity: Not on file   Lifestyle    Physical activity     Days per week: Not on file     Minutes per session: Not on file    Stress: Not on file   Relationships    Social connections     Talks on phone: Not on file     Gets together: Not on file     Attends Quaker service: Not on file Active member of club or organization: Not on file     Attends meetings of clubs or organizations: Not on file     Relationship status: Not on file    Intimate partner violence     Fear of current or ex partner: Not on file     Emotionally abused: Not on file     Physically abused: Not on file     Forced sexual activity: Not on file   Other Topics Concern    Not on file   Social History Narrative    Not on file       History reviewed. No pertinent family history. Current Outpatient Medications on File Prior to Visit   Medication Sig Dispense Refill    meloxicam (MOBIC) 7.5 MG tablet Take 1 tablet by mouth daily 30 tablet 0    medical marijuana Take 1 each by mouth as needed. No current facility-administered medications on file prior to visit. Pertinent items are noted in HPI  Review of systems reviewed from Patient History Form dated on 8/5/2020 and available in the patient's chart under the Media tab. No change noted. PHYSICAL EXAMINATION:  Mr. Christine Potts is a very pleasant 30 y.o.  male who presents today in no acute distress, awake, alert, and oriented. He is well dressed, nourished and  groomed. Patient with normal affect. Height is  5' 10\" (1.778 m), weight is 256 lb (116.1 kg), Body mass index is 36.73 kg/m². Resting respiratory rate is 16. The patient walks with no limp. The incision distal tuft index finger is sutured, still not completely healed. No signs of any erythema or drainage, mild swelling. He has no pain with the active or passive range of motion of the left hand and decreased ROM left index finger. He has intact sensation distally, and he is neurovascularly intact.                 IMAGING: Duncan Gong were reviewed, taken 8/19/2020 in the office, 3 views of the left hand, and showed a second finger distal phalanx fracture, s/p partial removal.    IMPRESSION:  4 weeks out from left index finger distal phalanx open fracture, s/p excisional debridement and

## 2020-09-18 ENCOUNTER — TELEPHONE (OUTPATIENT)
Dept: ORTHOPEDIC SURGERY | Age: 30
End: 2020-09-18

## 2020-09-18 NOTE — TELEPHONE ENCOUNTER
PT. REQUESTING A CALL BACK WAS UNABLE TO MAKE APPT.  WOULD LIKE TO DISCUSS TREATMENT OVER THE [phone]

## 2020-09-18 NOTE — TELEPHONE ENCOUNTER
Called and spoke with patient. He was asking if medical care could be given to him over the phone as he missed his appointment today and needs to know if there is anything further he should be doing to his finger. Explained to him that due to fact he was not seen in office we are not able to give out advice over the phone as his finger was not examined. Explained to him also that due to his no show history he is being dismissed from the practice. He asked if he went to the ER if they would advise him on what he should do for his finger. Explained that I did not know if ER would give out advice as surgery was done by Dr. Zaida Peguero. He said thank you and hung up.

## (undated) DEVICE — MAJOR SET UP PK

## (undated) DEVICE — SOLUTION IV IRRIG POUR BRL 0.9% SODIUM CHL 2F7124

## (undated) DEVICE — SHEET,DRAPE,53X77,STERILE: Brand: MEDLINE

## (undated) DEVICE — BANDAGE COMPR W4INXL15FT BGE E SGL LAYERED CLP CLSR

## (undated) DEVICE — ZIMMER® STERILE DISPOSABLE TOURNIQUET CUFF WITH PLC, DUAL PORT, SINGLE BLADDER, 18 IN. (46 CM)

## (undated) DEVICE — GLOVE SURG SZ 6 CRM LTX FREE POLYISOPRENE POLYMER BEAD ANTI

## (undated) DEVICE — ANTISEPTIC 1OZ POVIDONE IOD SOL PKT

## (undated) DEVICE — GLOVE SURG SZ 6 L12IN FNGR THK79MIL GRN LTX FREE

## (undated) DEVICE — SUTURE NONABSORBABLE MONOFILAMENT 4-0 FS-2 18 IN ETHILON 662H

## (undated) DEVICE — PADDING UNDERCAST W4INXL4YD 100% COT CRIMPED FINISH WBRL II

## (undated) DEVICE — SOLUTION PREP POVIDONE IOD FOR SKIN MUCOUS MEM PRIOR TO

## (undated) DEVICE — SPONGE LAP W18XL18IN WHT COT 4 PLY FLD STRUNG RADPQ DISP ST

## (undated) DEVICE — SPONGE GZ W4XL4IN COT 12 PLY TYP VII WVN C FLD DSGN

## (undated) DEVICE — COVER LT HNDL BLU PLAS

## (undated) DEVICE — ELECTRODE PT RET AD L9FT HI MOIST COND ADH HYDRGEL CORDED

## (undated) DEVICE — Z DISCONTINUED USE 2275686 GLOVE SURG SZ 8 L12IN FNGR THK13MIL WHT ISOLEX POLYISOPRENE

## (undated) DEVICE — DRAPE HND W114XL142IN BLU POLYPR W O PCH FEN CRD AND TB HLDR

## (undated) DEVICE — SOLUTION SCRB 4OZ 7.5% POVIDONE IOD FLIP TOP CAP

## (undated) DEVICE — GOWN SIRUS NONREIN XL W/TWL: Brand: MEDLINE INDUSTRIES, INC.

## (undated) DEVICE — GOWN SIRUS NONREIN LG W/TWL: Brand: MEDLINE INDUSTRIES, INC.

## (undated) DEVICE — Z DISCONTINUED BY MEDLINE USE 2711682 TRAY SKIN PREP DRY W/ PREM GLV

## (undated) DEVICE — PAD,ABDOMINAL,8"X7.5",STERILE,LF,1/PK: Brand: MEDLINE

## (undated) DEVICE — 4-PORT MANIFOLD: Brand: NEPTUNE 2

## (undated) DEVICE — CHLORAPREP 26ML ORANGE

## (undated) DEVICE — CANISTER, RIGID, 1200CC: Brand: MEDLINE INDUSTRIES, INC.

## (undated) DEVICE — MERCY HEALTH WEST TURNOVER: Brand: MEDLINE INDUSTRIES, INC.

## (undated) DEVICE — 3M™ STERI-DRAPE™ U-DRAPE 1015: Brand: STERI-DRAPE™

## (undated) DEVICE — GLOVE SURG SZ 8 CRM LTX FREE POLYISOPRENE POLYMER BEAD ANTI